# Patient Record
(demographics unavailable — no encounter records)

---

## 2025-04-02 NOTE — ASSESSMENT
[FreeTextEntry1] : 77 years old female with a history as outlined below.  -Hx of  Left sided breast cancer (DCIS)-> S/P lumpectomy 2006-> treated with radiation without chemotherapy. . Treated with Tamoxifen X 1 year and developed a right leg DVT and ultimately developed bilateral pulmonary emboli. Intubated and treated in ICU. Now recovered  # oncology [place], great neck [phone]  sees Oncology as f/u  Recent echo and stress test were within normal limits . 11/2023  #Hypertension   Blood pressure today elevated    Continue on Amlodipine 5 mg daily   Hydrochlorothiazide 12.5 mg QD Low salt diet  #Type 2 Diabetes    Januvia 50 mg QD  #History of pulmonary emboli/ Fam hx of hypercoagulable state    Continue on Eliquis 5 mg BID for prophylaxis    Encouraged patient to continue healthy exercise and eating habits, focusing on a Mediterranean style of eating and aiming for the recommended 150 minutes per week of moderate physical activity.  Follow up in 4 months

## 2025-04-02 NOTE — HISTORY OF PRESENT ILLNESS
[FreeTextEntry1] : 76  year old AA female with history of   Hypertension Type 2 diabetes 6.3 1/2025 SVT Left sided breast cancer (DCIS)-> S/P lumpectomy 2006-> treated with radiation; no chemotherapy. Treated with Tamoxifen x I year  ->d/eugene due to DVT R leg DVT and ultimately developed bilateral pulmonary emboli. Intubated and treated in ICU.  Recent echo on December 13, 2022 Normal LV and RV systolic function Recent Nuclear stress testing December 14, 2022 Normal study EKG- Sinus rhythm, poor R wave progression @ 75 bpm  CT recently 11/2/22 Coronary calcifications  Follow-up 8/30/2023 Doing well ROS: Denies Chest pain, dyspnea, palpitations, dizziness or syncope.  Interval follow up 3/20/24 ROS: Denies Chest pain, dyspnea, palpitations, dizziness or syncope Echo done November 2023 ejection fraction 69% no significant valvular disease  Interval follow up 3/26/2025 Lab reviewed 1/2025 Hb aic 6.3, Cr 0.95, GFR 61 Chol 189 Hdl 56 tg 105  s/p left and right cataract sx compliant with medication compliant with diet new medication for DM  Here for routine follow up no complaints today.  c/o left arm pain when stressed.   Left breast pain/ LUQ no dyspnea, diaphoresis, palpitations. Alleviating factors: rest

## 2025-04-02 NOTE — ADDENDUM
[FreeTextEntry1] : June 27, 2024, addendum presurgical clearance for cataract surgery  EKG normal sinus rhythm Echo 11/2023 EF 69% no significant right or left ventricular dysfunction no valvular disease Functional Status: Mets >5 mets No evidence of angina, heart failure, arrhythmia   Blood thinners: Hold Eliquis 2 days  before surgery There are no cardiac contraindications to planned cataract surgery.  May hold the Eliquis 2 days before surgery.  If there are any further questions please reach out at .

## 2025-04-02 NOTE — ASSESSMENT
[FreeTextEntry1] : 77 years old female with a history as outlined below.  -Hx of  Left sided breast cancer (DCIS)-> S/P lumpectomy 2006-> treated with radiation without chemotherapy. . Treated with Tamoxifen X 1 year and developed a right leg DVT and ultimately developed bilateral pulmonary emboli. Intubated and treated in ICU. Now recovered  # oncology [place], great neck [phone]  sees Oncology as f/u  Recent echo and stress test were within normal limits . 11/2023  #Hypertension   Blood pressure today elevated    Continue on Amlodipine 5 mg daily   Hydrochlorothiazide 12.5 mg QD Low salt diet  #Type 2 Diabetes    Januvia 50 mg QD  #History of pulmonary emboli/ Fam hx of hypercoagulable state    Continue on Eliquis 5 mg BID for prophylaxis    Encouraged patient to continue healthy exercise and eating habits, focusing on a Mediterranean style of eating and aiming for the recommended 150 minutes per week of moderate physical activity.  Follow up in 4 months

## 2025-06-24 NOTE — HISTORY OF PRESENT ILLNESS
[FreeTextEntry1] : 78  year old AA female presents for cardio-oncology follow up  hx of:  -Hypertension -Type 2 diabetes 6.3 1/2025 -SVT -Left sided breast cancer (DCIS)-> S/P lumpectomy 2006-> treated with radiation; no chemotherapy. - Pulmonary embolus Treated with Tamoxifen x I year  ->d/eugene due to DVT -R leg DVT and ultimately developed bilateral pulmonary emboli. Intubated and treated in ICU. - CAD nonobstructive  Recent echo on December 13, 2022 Normal LV and RV systolic function Recent Nuclear stress testing December 14, 2022 Normal study EKG- Sinus rhythm, poor R wave progression @ 75 bpm  CT recently 11/2/22 Coronary calcifications  Follow-up 8/30/2023 Doing well ROS: Denies Chest pain, dyspnea, palpitations, dizziness or syncope.  Interval follow up 3/20/24 ROS: Denies Chest pain, dyspnea, palpitations, dizziness or syncope Echo done November 2023 ejection fraction 69% no significant valvular disease  Interval follow up 3/26/2025 Lab reviewed 1/2025 Hb aic 6.3, Cr 0.95, GFR 61 Chol 189 Hdl 56 tg 105  s/p left and right cataract sx new medication for DM  Here for routine follow up no complaints today.  c/o left arm pain when stressed.   Left breast pain/ LUQ no dyspnea, diaphoresis, palpitations. Alleviating factors: rest   Telehealth visit June 17, 2025 to review stress echocardiogram and DEXA scan Within normal limits Sent to her PCP

## 2025-06-24 NOTE — CARDIOLOGY SUMMARY
[de-identified] : March 20 2024  sinus rhythm EKG- Sinus rhythm, poor R wave progression @ 75 bpm  [de-identified] : Recent Nuclear stress testing December 14, 2022 Normal study [de-identified] : Recent echo on December 13, 2022 Normal LV and RV systolic function  [de-identified] :  CT recently 11/2/22 Coronary calcifications [___] : [unfilled]

## 2025-06-24 NOTE — REASON FOR VISIT
[Spouse] : spouse [Home] : at home, [unfilled] , at the time of the visit. [Medical Office: ([facility])___] : at the medical office located in  [Telephone (audio)] : This telephonic visit was provided via audio only technology. [Patient preference] : patient preference. [Verbal consent obtained from patient] : the patient, [unfilled] [Symptom and Test Evaluation] : symptom and test evaluation

## 2025-06-24 NOTE — ASSESSMENT
[FreeTextEntry1] :   78 years old female with a history as outlined below.  -Hx of  Left sided breast cancer (DCIS)-> S/P lumpectomy 2006-> treated with radiation without chemotherapy. . Treated with Tamoxifen X 1 year and developed a right leg DVT and ultimately developed bilateral pulmonary emboli. Intubated and treated in ICU. Now recovered  # oncology [place], great neck [phone]  sees Oncology as f/u  Recent echo and stress test were within normal limits. 11/2023  # chest pain r/o ischemia stress echo echo reviewed and was within normal limits  #Hypertension   Blood pressure today better controlled   Continue on Amlodipine 5 mg daily Low salt diet  #Type 2 Diabetes    Januvia 50 mg QD  #History of pulmonary emboli/ Fam hx of hypercoagulable state    Continue on Eliquis 5 mg BID   #DEXA scan reviewed sent to patient's PCP within normal limits   Encouraged patient to continue healthy exercise and eating habits, focusing on a Mediterranean style of eating and aiming for the recommended 150 minutes per week of moderate physical activity.  Follow up in 6 months